# Patient Record
Sex: MALE | Race: WHITE
[De-identification: names, ages, dates, MRNs, and addresses within clinical notes are randomized per-mention and may not be internally consistent; named-entity substitution may affect disease eponyms.]

---

## 2020-09-04 ENCOUNTER — HOSPITAL ENCOUNTER (OUTPATIENT)
Dept: HOSPITAL 47 - ORWHC2ENDO | Age: 40
Discharge: HOME | End: 2020-09-04
Attending: INTERNAL MEDICINE
Payer: COMMERCIAL

## 2020-09-04 VITALS — TEMPERATURE: 97 F

## 2020-09-04 VITALS — BODY MASS INDEX: 27.8 KG/M2

## 2020-09-04 VITALS — DIASTOLIC BLOOD PRESSURE: 75 MMHG | HEART RATE: 59 BPM | SYSTOLIC BLOOD PRESSURE: 118 MMHG

## 2020-09-04 VITALS — RESPIRATION RATE: 16 BRPM

## 2020-09-04 DIAGNOSIS — K92.1: ICD-10-CM

## 2020-09-04 DIAGNOSIS — K21.9: ICD-10-CM

## 2020-09-04 DIAGNOSIS — Z79.899: ICD-10-CM

## 2020-09-04 DIAGNOSIS — K64.8: Primary | ICD-10-CM

## 2020-09-04 DIAGNOSIS — Z88.0: ICD-10-CM

## 2020-09-04 PROCEDURE — 45378 DIAGNOSTIC COLONOSCOPY: CPT

## 2020-09-04 NOTE — P.PCN
Date of Procedure: 09/04/20


Procedure(s) Performed: 


BRIEF HISTORY: Patient is a 41-year-old [place] male scheduled for an 

elective colonoscopy as a part of  evaluation of blood in the stool for the last

3 weeks..





PROCEDURE PERFORMED: Colonoscopy. 





PREOPERATIVE DIAGNOSIS: Blood in the stool of 3 weeks' duration





IV sedation per Anesthesia. 





PROCEDURE: After informed consent was obtained, the patient, was brought into 

the endoscopy unit. IV sedation was administered by Anesthesia under continuous 

monitoring.  Digital rectal examination was normal. Initially the Olympus CF-160

flexible video colonoscope was then inserted in the rectum, gradually advanced 

into the cecum without any difficulty. Careful examination was performed as the 

scope was gradually being withdrawn. Ileocecal valve and the appendiceal orifice

were visualized and appeared normal.  Prep was good. Mucosa of the cecum, 

ascending colon, transverse colon, descending colon, sigmoid colon, and rectum 

appeared normal. Retroflexion was performed in the rectum and  small internal 

hemorrhoids ere seen. The patient tolerated the procedure well. 





IMPRESSION: 


Normal-appearing colon from rectum to cecum with no evidence of colorectal 

neoplasia  .


Small internal hemorrhoids.





RECOMMENDATIONS:  Findings of this examination were discussed with the patient 

as well as his family.  He was advised to be a high-fiber diet and take fiber 

supplements on a regular basis.  He can have a repeat screening colonoscopy at 

age 48.

## 2020-11-30 ENCOUNTER — HOSPITAL ENCOUNTER (OUTPATIENT)
Dept: HOSPITAL 47 - RADUSWWP | Age: 40
Discharge: HOME | End: 2020-11-30
Attending: FAMILY MEDICINE
Payer: COMMERCIAL

## 2020-11-30 DIAGNOSIS — N13.8: Primary | ICD-10-CM

## 2020-11-30 PROCEDURE — 76872 US TRANSRECTAL: CPT

## 2020-11-30 PROCEDURE — 76857 US EXAM PELVIC LIMITED: CPT

## 2020-11-30 NOTE — US
EXAMINATION TYPE: US prostate transrectal

 

DATE OF EXAM: 11/30/2020

 

COMPARISON: NONE

 

CLINICAL HISTORY: N13.8 other obstructive and reflux uropathy.

 

This examination was performed using the transrectal probe. 

 

EXAM MEASUREMENTS:

 

Gland Size: 5.0 x 2.0 x 4.3cm

Volume: 22.4

Predicted PSA:  2.7

Actual PSA (if available): n/a

 

Seminal vesicles appear within normal limits. Prostate gland is normal in size and heterogeneous in a
ppearance without discrete nodule.

 

 

 

IMPRESSION:  Normal-size prostate without discrete nodule. Correlate with PSA advised.

 

 

Predicted PSA = volume x 0.12 ng/ml

Calculated Volume = 0.5236 x L x W x H

## 2020-11-30 NOTE — US
EXAMINATION TYPE: US bladder

 

DATE OF EXAM: 11/30/2020

 

COMPARISON: NONE

 

CLINICAL HISTORY: 40-year-old male N13.8 other obstructive and reflux uropathy.

 

TECHNIQUE: Multiple sonographic images of the bladder are obtained.

 

FINDINGS:

 

No gross abnormality of the initially urine distended bladder.

Post Void Residual Volume:  16.8 mL

 

 

 

Color Doppler performed to assess ureteral jets.

** Bilateral Jets seen:  Yes

 

** Normal Post Void Residual (less than 50ml):  Yes

 

 

 

IMPRESSION:  

Increased post void bladder volume of 17 mL still falls within acceptable limits. Prostate ultrasound
 reported separately.

## 2021-09-10 ENCOUNTER — HOSPITAL ENCOUNTER (OUTPATIENT)
Dept: HOSPITAL 47 - RADUSWWP | Age: 41
Discharge: HOME | End: 2021-09-10
Attending: FAMILY MEDICINE
Payer: COMMERCIAL

## 2021-09-10 DIAGNOSIS — F45.8: ICD-10-CM

## 2021-09-10 DIAGNOSIS — R22.1: Primary | ICD-10-CM

## 2021-09-10 DIAGNOSIS — R06.02: ICD-10-CM

## 2021-09-10 PROCEDURE — 76536 US EXAM OF HEAD AND NECK: CPT

## 2021-09-10 NOTE — US
EXAMINATION TYPE: US thyroid st tissue head/neck

 

DATE OF EXAM: 9/10/2021

 

COMPARISON: NONE

 

CLINICAL HISTORY: F45.8 Feeling of lump in throat. SOB. 

 

bilateral neck scanned, no evidence of an abnormality by ultrasound. lymph nodes noted, largest right
 neck = 1.0cm 

 

 

 

 

 

IMPRESSION:  Lymph nodes as discussed.